# Patient Record
(demographics unavailable — no encounter records)

---

## 2025-04-25 NOTE — HISTORY OF PRESENT ILLNESS
[de-identified] : Jacquelin is a 2 year old female with ETD  2-3 back to back ear infections over the winter  3 ear infections int he past 6 months  Pulls at ears often   Fever and pain with first infection   Failed  screen and passed on 2nd try  No speech delay   + Chronic nasal congestion over the winter and with allergy season  No snoring at night   pulling at ear past 3 days   No personal hx of bleeding issues  Mom with Factor V Emma  NO personal or family hx of anesthesia issues   GI- MPA - now on dairy- Alverto Vitale

## 2025-04-25 NOTE — PHYSICAL EXAM
[Partial] : partial cerumen impaction [Normal Gait and Station] : normal gait and station [Normal muscle strength, symmetry and tone of facial, head and neck musculature] : normal muscle strength, symmetry and tone of facial, head and neck musculature [Normal] : the right canal was normal [Exposed Vessel] : left anterior vessel not exposed [Increased Work of Breathing] : no increased work of breathing with use of accessory muscles and retractions

## 2025-04-25 NOTE — CONSULT LETTER
[Dear  ___] : Dear  [unfilled], [Courtesy Letter:] : I had the pleasure of seeing your patient, [unfilled], in my office today. [Please see my note below.] : Please see my note below. [Consult Closing:] : Thank you very much for allowing me to participate in the care of this patient.  If you have any questions, please do not hesitate to contact me. [Sincerely,] : Sincerely, [FreeTextEntry2] : Apple Torres  [address] [facility], [address] [place] [id number]  [FreeTextEntry3] : Mekhi Pollard MD Chief, Pediatric Otolaryngology [facility] and Kiara Macias [facility] Professor of Otolaryngology [facility] [employer] at [facility]

## 2025-04-25 NOTE — BIRTH HISTORY
[At ___ Weeks Gestation] : at [unfilled] weeks gestation [ Section] : by  section [None] : No maternal complications [Passed] : passed [de-identified] : NICU, Feeding tube, No intubation, hyperbilirubinemia  [de-identified] : preeclampsia